# Patient Record
Sex: FEMALE | Race: WHITE
[De-identification: names, ages, dates, MRNs, and addresses within clinical notes are randomized per-mention and may not be internally consistent; named-entity substitution may affect disease eponyms.]

---

## 2017-05-22 NOTE — RADIOLOGY REPORT (SQ)
EXAM DESCRIPTION:  CT LTD RENAL STONE PROTOCOL ON



COMPLETED DATE/TIME:  5/22/2017 1:52 pm



REASON FOR STUDY:  r flank pain



COMPARISON:  None.



TECHNIQUE:  CT scan of the abdomen and pelvis performed without intravenous or oral contrast. Images 
reviewed with lung, soft tissue, and bone windows. Reconstructed coronal and sagittal MPR images revi
ewed. All images stored on PACS.

All CT scanners at this facility use dose modulation, iterative reconstruction, and/or weight based d
osing when appropriate to reduce radiation dose to as low as reasonably achievable (ALARA).

[facility]: Dose Right  [facility]: CareDose    [facility]: Dose Right    [facility]: Teradose 4D    [facility]: Smart Technologies



RADIATION DOSE:  10.57mGy.



LIMITATIONS:  None.



FINDINGS:  LOWER CHEST: No significant findings. No nodules or infiltrates.

NON-CONTRASTED LIVER, SPLEEN, ADRENALS: Evaluation limited by lack of IV contrast. No identified sign
ificant masses.

PANCREAS: No masses. No peripancreatic inflammatory changes.

GALLBLADDER: No identified stones by CT criteria. No inflammatory changes to suggest cholecystitis.

RIGHT KIDNEY AND URETER: No suspicious masses. Assessment limited by lack of IV contrast.   Small bucky
yceal calcifications.   No hydronephrosis or hydroureter.

LEFT KIDNEY AND URETER: No suspicious masses. Assessment limited by lack of IV contrast.   Small lisa
ceal calcifications.   No hydronephrosis or hydroureter.

AORTA AND RETROPERITONEUM: No aneurysm. No retroperitoneal masses or adenopathy.

BOWEL AND PERITONEAL CAVITY: No obvious masses or inflammatory changes. No free fluid.

APPENDIX: Surgically absent.

PELVIS, BLADDER, AND ABDOMINAL WALL:No abnormal masses. No free fluid. Bladder normal.

BONES: No significant findings.

OTHER: No other significant finding.



IMPRESSION:  SMALL NONOBSTRUCTING CALYCEAL CALCULI IN BOTH KIDNEYS.  NO URETERAL CALCULI OR OBSTRUCTI
VE UROPATHY.  NO OTHER SIGNIFICANT OR ACUTE PROCESS IN THE ABDOMEN OR PELVIS.



TECHNICAL DOCUMENTATION:  JOB ID:  [id number]

Quality ID # 436: Final reports with documentation of one or more dose reduction techniques (e.g., Au
tomated exposure control, adjustment of the mA and/or kV according to patient size, use of iterative 
reconstruction technique)

 2011 [employer]- All Rights Reserved

## 2017-05-22 NOTE — ER DOCUMENT REPORT
ED GI/





- General


Chief Complaint: Flank Pain


Stated Complaint: RIGHT FLANK PAIN


Time Seen by Provider: 17 11:42


Mode of Arrival: Ambulatory


Information source: Patient


Notes: 


Patient presents complaining of right flank pain for the past week.  Patient 

reports nausea and vomiting dating that she spit up once some mucus.  Patient 

denies any fever or diarrhea.  Patient does report urinary frequency and 

dysuria.  Patient has a history kidney stones in the past and suspects the same 

today.


TRAVEL OUTSIDE OF THE [place] IN LAST 30 DAYS: No





- HPI


Patient complains to provider of: Flank pain, Vomiting.  No: Abdominal pain, 

Vaginal pain


Onset: Last week


Timing/Duration: Persistent


Quality of pain: Sharp


Pain Level: 5


Location: Right flank.  No: RLQ


Vaginal bleeding (Compared to normal period): None


Associated symptoms: Dysuria, Nausea, Urinary frequency, Vomiting.  denies: 

Constipation, Diarrhea, Fever, Urinary hesitancy


Exacerbated by: Denies


Relieved by: Denies


Similar symptoms previously: Yes


Recently seen / treated by doctor: No





- Related Data


Allergies/Adverse Reactions: 


 





latex Allergy (Verified 17 11:33)


 


cillins Allergy (Uncoded 17 11:33)


 











Past Medical History





- General


Information source: Patient


Last Menstrual Period: mirena





- Social History


Smoking Status: Former Smoker


Frequency of alcohol use: None


Drug Abuse: None


Occupation: none


Lives with: Family


Family History: Reviewed & Not Pertinent


Patient has suicidal ideation: No


Patient has homicidal ideation: No





- Medical History


Medical History: Other - MS


Pulmonary Medical History: Reports: Hx COPD


Renal/ Medical History: Reports: Hx Kidney Stones.  Denies: Hx Peritoneal 

Dialysis


Musculoskeltal Medical History: Reports Hx Fibromyalgia


Past Surgical History: Reports: Hx  Section, Hx Gynecologic Surgery - r 

fallopian tube removed, Hx Urinary Tract Surgery - lithotripsy, renal stents





Review of Systems





- Review of Systems


Constitutional: Recent illness - pneumonia.  denies: Fever


EENT: No symptoms reported


Cardiovascular: No symptoms reported.  denies: Chest pain


Respiratory: No symptoms reported.  denies: Cough


Gastrointestinal: Nausea, Vomiting.  denies: Abdominal pain, Diarrhea, Poor 

appetite


Genitourinary: Dysuria, Frequency, Flank pain


Female Genitourinary: No symptoms reported.  denies: Pregnant, Vaginal discharge


Musculoskeletal: Back pain - right


Skin: No symptoms reported


Hematologic/Lymphatic: No symptoms reported


Neurological/Psychological: No symptoms reported





Physical Exam





- Vital signs


Vitals: 


 











Temp Pulse Resp BP Pulse Ox


 


 97.8 F   121 H  20   152/113 H  98 


 


 17 11:31  17 11:31  17 11:31  17 11:31  17 11:31














- General


General appearance: Appears well, Alert


In distress: None





- HEENT


Head: Normocephalic, Atraumatic


Eyes: Normal


Nasal: Normal


Mouth/Lips: Normal


Mucous membranes: Normal


Neck: Normal, Supple.  No: Lymphadenopathy





- Respiratory


Respiratory status: No respiratory distress


Chest status: Nontender


Breath sounds: Normal.  No: Rales, Rhonchi, Stridor, Wheezing


Chest palpation: Normal





- Cardiovascular


Rhythm: Tachycardia


Heart sounds: S1 appreciated, S2 appreciated


Murmur: No





- Abdominal


Inspection: Normal


Distension: No distension


Bowel sounds: Normal


Tenderness: Nontender


Organomegaly: No organomegaly





- Back


Back: CVA tenderness - right





- Extremities


General upper extremity: Normal inspection, Normal ROM


General lower extremity: Normal inspection, Normal ROM





- Neurological


Neuro grossly intact: Yes


Cognition: Normal


Gilbert Coma Scale Eye Opening: Spontaneous


Gilbert Coma Scale Verbal: Oriented


Gilbert Coma Scale Motor: Obeys Commands


Gilbert Coma Scale Total: 15





- Psychological


Associated symptoms: Normal affect, Normal mood





- Skin


Skin Temperature: Warm


Skin Moisture: Dry


Skin Color: Normal





Course





- Re-evaluation


Re-evalutation: 


17 13:11


Consulted with Dr. Azevedo who recommends CT limited imaging of her flank area to 

further evaluate her pain today.





17 14:30








- Vital Signs


Vital signs: 


 











Temp Pulse Resp BP Pulse Ox


 


 97.7 F   90   20   153/109 H  99 


 


 17 13:06  17 13:06  17 13:06  17 13:06  17 13:06














- Laboratory


Result Diagrams: 


 17 12:08





 17 12:08


Laboratory results interpreted by me: 


 











  17





  12:08 12:08 12:09


 


WBC  11.1 H  


 


RDW  18.3 H  


 


Plt Count  524 H  


 


Chloride   108 H 


 


Carbon Dioxide   19 L 


 


Glucose   118 H 


 


Direct Bilirubin   0.5 H 


 


Urine Protein    30 H


 


Urine Blood    LARGE H











 Labs- Entire Visit











  17





  12:08 12:08 12:08


 


WBC  11.1 H  


 


RBC  4.69  


 


Hgb  13.0  


 


Hct  39.4  


 


MCV  84  


 


MCH  27.7  


 


MCHC  33.0  


 


RDW  18.3 H  


 


Plt Count  524 H  


 


Seg Neutrophils %  71.4  


 


Lymphocytes %  22.6  


 


Monocytes %  4.4  


 


Eosinophils %  0.9  


 


Basophils %  0.7  


 


Absolute Neutrophils  8.0  


 


Absolute Lymphocytes  2.5  


 


Absolute Monocytes  0.5  


 


Absolute Eosinophils  0.1  


 


Absolute Basophils  0.1  


 


Sodium   137.5 


 


Potassium   4.0 


 


Chloride   108 H 


 


Carbon Dioxide   19 L 


 


Anion Gap   11 


 


BUN   11 


 


Creatinine   0.74 


 


Est GFR ( Amer)   > 60 


 


Est GFR (Non-Af Amer)   > 60 


 


Glucose   118 H 


 


Calcium   9.1 


 


Total Bilirubin   0.7 


 


Direct Bilirubin   0.5 H 


 


Indirect Bilirubin   Not Reportable 


 


Neonat Total Bilirubin   Not Reportable 


 


AST   17 


 


ALT   26 


 


Alkaline Phosphatase   85 


 


Total Protein   6.4 


 


Albumin   3.6 


 


Lipase   79.4 


 


Serum HCG, Qual    NEGATIVE


 


Urine Color   


 


Urine Appearance   


 


Urine pH   


 


Ur Specific Gravity   


 


Urine Protein   


 


Urine Glucose (UA)   


 


Urine Ketones   


 


Urine Blood   


 


Urine Nitrite   


 


Urine Bilirubin   


 


Urine Urobilinogen   


 


Ur Leukocyte Esterase   


 


Urine WBC (Auto)   


 


Urine RBC (Auto)   


 


U Hyaline Cast (Auto)   


 


Urine Bacteria (Auto)   


 


Squamous Epi Cells Auto   


 


Urine Mucus (Auto)   


 


Urine Ascorbic Acid   














  17





  12:09


 


WBC 


 


RBC 


 


Hgb 


 


Hct 


 


MCV 


 


MCH 


 


MCHC 


 


RDW 


 


Plt Count 


 


Seg Neutrophils % 


 


Lymphocytes % 


 


Monocytes % 


 


Eosinophils % 


 


Basophils % 


 


Absolute Neutrophils 


 


Absolute Lymphocytes 


 


Absolute Monocytes 


 


Absolute Eosinophils 


 


Absolute Basophils 


 


Sodium 


 


Potassium 


 


Chloride 


 


Carbon Dioxide 


 


Anion Gap 


 


BUN 


 


Creatinine 


 


Est GFR ( Amer) 


 


Est GFR (Non-Af Amer) 


 


Glucose 


 


Calcium 


 


Total Bilirubin 


 


Direct Bilirubin 


 


Indirect Bilirubin 


 


Neonat Total Bilirubin 


 


AST 


 


ALT 


 


Alkaline Phosphatase 


 


Total Protein 


 


Albumin 


 


Lipase 


 


Serum HCG, Qual 


 


Urine Color  YELLOW


 


Urine Appearance  SLIGHTLY-CLOUDY


 


Urine pH  6.0


 


Ur Specific Gravity  1.027


 


Urine Protein  30 H


 


Urine Glucose (UA)  NEGATIVE


 


Urine Ketones  NEGATIVE


 


Urine Blood  LARGE H


 


Urine Nitrite  NEGATIVE


 


Urine Bilirubin  NEGATIVE


 


Urine Urobilinogen  NEGATIVE


 


Ur Leukocyte Esterase  NEGATIVE


 


Urine WBC (Auto)  4


 


Urine RBC (Auto)  2


 


U Hyaline Cast (Auto)  1


 


Urine Bacteria (Auto)  TRACE


 


Squamous Epi Cells Auto  11


 


Urine Mucus (Auto)  RARE


 


Urine Ascorbic Acid  NEGATIVE














17 14:29








17 14:30








- Diagnostic Test


Radiology reviewed: Reports reviewed





Discharge





- Discharge


Clinical Impression: 


 Calyceal renal calculus, Flank pain





Condition: Stable


Disposition: HOME, SELF-CARE


Instructions:  Flank Pain ([facility]), Hematuria ([facility]), Oral Narcotic Medication ([facility])


Additional Instructions: 


Return immediately for any new or worsening symptoms





Followup with your primary care provider, call tomorrow to make a followup 

appointment





Follow Up with urologist for further evaluation, call tomorrow for an 

appointment


Prescriptions: 


Hydrocodone/Acetaminophen [Norco 5-325 Tablet] 1 each PO Q6 PRN #12 tablet


 PRN Reason: 


Referrals: 


[facility] [Provider Group] - Follow up as needed


TONA CADE MD [[facility]] - Follow up in 3-5 days

## 2017-08-17 NOTE — ER DOCUMENT REPORT
ED General Pain





- General


Chief Complaint: Pain All Over


Stated Complaint: PAIN ALL OVER


Time Seen by Provider: 17 15:07


Mode of Arrival: Ambulatory


Information source: Patient


Notes: 





Pt is a 41 year old female who presents to the ER for "pain all over" from her 

MS. She states she's had blood pressure as high as 188/90s today because of the 

pain. She says the worse part of her pain is in her back. She takes tylenol 

500mg for pain. Is supposed to be going to pain management. 


TRAVEL OUTSIDE OF THE [place] IN LAST 30 DAYS: No





- Related Data


Allergies/Adverse Reactions: 


 





latex Allergy (Verified 17 15:07)


 


cillins Allergy (Uncoded 17 15:07)


 











Past Medical History





- General


Information source: Patient





- Social History


Smoking Status: Current Every Day Smoker


Chew tobacco use (# tins/day): No


Frequency of alcohol use: None


Drug Abuse: None


Family History: Reviewed & Not Pertinent


Pulmonary Medical History: Reports: Hx COPD


Renal/ Medical History: Reports: Hx Kidney Stones.  Denies: Hx Peritoneal 

Dialysis


Musculoskeltal Medical History: Reports Hx Fibromyalgia


Past Surgical History: Reports: Hx  Section, Hx Gynecologic Surgery - r 

fallopian tube removed, Hx Urinary Tract Surgery - lithotripsy, renal stents





Review of Systems





- Review of Systems


Constitutional: No symptoms reported


EENT: No symptoms reported


Cardiovascular: No symptoms reported


Respiratory: No symptoms reported


Gastrointestinal: No symptoms reported


Genitourinary: No symptoms reported


Female Genitourinary: No symptoms reported


Musculoskeletal: See HPI


Skin: No symptoms reported


Hematologic/Lymphatic: No symptoms reported


Neurological/Psychological: No symptoms reported





Physical Exam





- Vital signs


Vitals: 


 











Temp Pulse Resp BP Pulse Ox


 


 97.7 F   88   20   170/93 H  98 


 


 17 14:41  17 14:41  17 14:41  17 14:41  17 14:41














- Notes


Notes: 





PHYSICAL EXAMINATION: 


GENERAL: Appears uncomfortable, but in no acute distress. 


HEAD: Atraumatic, normocephalic. 


EYES: Pupils equal round and reactive to light, extraocular movements intact, 

sclera anicteric, conjunctiva are normal. 


NECK: Normal range of motion, supple without lymphadenopathy 


LUNGS: CTAB and equal. No wheezes rales or rhonchi. 


HEART: Regular rate and rhythm without murmurs


ABDOMEN: Soft, no tenderness. No guarding, no rebound 


BACK: Lumbar vertebral tenderness, tender over entire low back, decreased range 

of motion due to pain


GI/: no CVA tenderness


EXTREMITIES: Normal range of motion, no pitting edema. No cyanosis. 


NEUROLOGICAL: Cranial nerves grossly intact. Normal sensory/motor exams. 


PSYCH: Normal mood, normal affect. 


SKIN: Warm, Dry, normal turgor, no rashes or lesions noted 

















Course





- Re-evaluation


Re-evalutation: 





17 21:04


I did advise patient that I would not give her a prescription for any narcotic 

medication but would give her something while she was here.  I did put her on a 

prednisone taper for her possible MS flare.





- Vital Signs


Vital signs: 


 











Temp Pulse Resp BP Pulse Ox


 


 97.8 F   80   18   148/133 H  98 


 


 17 15:24  17 15:24  17 15:24  17 15:24  17 15:24














Discharge





- Discharge


Clinical Impression: 


 Total body pain





Back pain


Qualifiers:


 Back pain location: low back pain Chronicity: chronic Back pain laterality: 

midline Sciatica presence: without sciatica Qualified Code(s): M54.5 - Low back 

pain





Condition: Stable


Disposition: HOME, SELF-CARE


Additional Instructions: 


FOLLOW-UP CARE:


If you have been referred to a physician for follow-up care, call the physician

s office for an appointment as you were instructed or within the next two days.

  If you experience worsening or a significant change in your symptoms, notify 

the physician immediately or return to the Emergency Department at any time for 

re-evaluation.


Prescriptions: 


Prednisone [Sterapred Ds] 1 pkg PO ASDIR PRN 12 Days  tab.ds.pk


 PRN Reason:

## 2017-10-30 ENCOUNTER — HOSPITAL ENCOUNTER (EMERGENCY)
Dept: HOSPITAL 62 - ER | Age: 42
Discharge: HOME | End: 2017-10-30
Payer: MEDICAID

## 2017-10-30 VITALS — SYSTOLIC BLOOD PRESSURE: 181 MMHG | DIASTOLIC BLOOD PRESSURE: 113 MMHG

## 2017-10-30 DIAGNOSIS — Z87.442: ICD-10-CM

## 2017-10-30 DIAGNOSIS — Z88.0: ICD-10-CM

## 2017-10-30 DIAGNOSIS — S01.81XA: ICD-10-CM

## 2017-10-30 DIAGNOSIS — S09.90XA: Primary | ICD-10-CM

## 2017-10-30 DIAGNOSIS — Z91.040: ICD-10-CM
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
## 2017-10-30 DIAGNOSIS — F17.200: ICD-10-CM

## 2017-10-30 DIAGNOSIS — J44.9: ICD-10-CM

## 2017-10-30 PROCEDURE — 99283 EMERGENCY DEPT VISIT LOW MDM: CPT
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
## 2017-11-14 ENCOUNTER — HOSPITAL ENCOUNTER (EMERGENCY)
Dept: HOSPITAL 62 - ER | Age: 42
Discharge: HOME | End: 2017-11-14
Payer: MEDICAID

## 2017-11-14 VITALS — SYSTOLIC BLOOD PRESSURE: 120 MMHG | DIASTOLIC BLOOD PRESSURE: 90 MMHG

## 2017-11-14 DIAGNOSIS — F17.200: ICD-10-CM

## 2017-11-14 DIAGNOSIS — I88.9: Primary | ICD-10-CM

## 2017-11-14 DIAGNOSIS — M54.2: ICD-10-CM

## 2017-11-14 DIAGNOSIS — J44.9: ICD-10-CM

## 2017-11-14 DIAGNOSIS — G35: ICD-10-CM

## 2017-11-14 DIAGNOSIS — Z91.040: ICD-10-CM

## 2017-11-14 DIAGNOSIS — Z88.0: ICD-10-CM

## 2017-11-14 PROCEDURE — 99283 EMERGENCY DEPT VISIT LOW MDM: CPT

## 2017-11-14 NOTE — ER DOCUMENT REPORT
ED General





- General


Chief Complaint: Neck Pain < 24hrs old


Stated Complaint: NECK PAIN


Time Seen by Provider: 17 02:49


Mode of Arrival: Ambulatory


Information source: Patient


TRAVEL OUTSIDE OF THE [place] IN LAST 30 DAYS: No





- HPI


Notes: 





42-year-old female with history of fibromyalgia and multiple sclerosis presents 

with right-sided neck pain.  She has noted knots and painful areas over the 

right posterior portion of her neck for the last 2 days.  She states they are 

very painful despite taking ibuprofen at home.  Denies fever or systemic 

symptoms.  She did have head trauma a few days back but states she had no 

injury to her neck at that point.  She denies any other new symptoms, focal 

weakness numbness or tingling.  Denies scalp laceration or pain.





- Related Data


Allergies/Adverse Reactions: 


 





latex Allergy (Verified 17 15:07)


 


cillins Allergy (Uncoded 17 15:07)


 











Past Medical History





- Social History


Smoking Status: Current Every Day Smoker


Chew tobacco use (# tins/day): No


Drug Abuse: None


Family History: Reviewed & Not Pertinent


Patient has suicidal ideation: No


Patient has homicidal ideation: No


Pulmonary Medical History: Reports: Hx COPD


Renal/ Medical History: Reports: Hx Kidney Stones.  Denies: Hx Peritoneal 

Dialysis


Musculoskeltal Medical History: Reports Hx Fibromyalgia


Past Surgical History: Reports: Hx  Section, Hx Gynecologic Surgery - r 

fallopian tube removed, Hx Urinary Tract Surgery - lithotripsy, renal stents





Review of Systems





- Review of Systems


-: Yes All other systems reviewed and negative





Physical Exam





- Vital signs


Vitals: 


 











Temp Pulse Resp BP Pulse Ox


 


 98 F   132 H  18   153/96 H  98 


 


 17 02:20  17 02:20  17 02:20  17 02:20  17 02:20














- Notes


Notes: 





GENERAL: VS as per nursing doc. appears older than stated age, well-nourished 

and in no acute distress.





HEAD: Atraumatic, normocephalic.





EYES: Pupils equal round and reactive to light, extraocular movements intact, 

sclera anicteric, no conjunctival injection or discharge.





ENT: Nares patent, oropharynx clear without exudates, moist mucous membranes.  

There are mobile nodes noted in the right posterior triangle with the largest 

being approximately 1 cm.  Very tender but no overlying erythema or separation.

  There are some areas of excoriation mild erythema more superiorly toward the 

mastoid region but the mastoid itself is nontender and not affected.  

Evaluation of the scalp otherwise show no lesions in the hair.





NECK: Normal range of motion, supple without lymphadenopathy.





LUNGS: Coarse with decreased breath sounds bilaterally





HEART: Regular rhythm, no murmurs, heart rate 102 on recheck





ABDOMEN: Soft, non-tender, normoactive bowel sounds.  No guarding, no rebound.  

No masses appreciated.  No [place] sign.





EXTREMITIES: Normal range of motion, no calf tenderness, no edema.





NEUROLOGICAL: No gross motor or sensory abnormalities.  Cranial nerves intact.





PSYCH: Normal mood, normal affect.





SKIN: Warm, dry, normal turgor, please see ENT.











Course





- Re-evaluation


Re-evalutation: 





17 03:19


This does not appear to be trauma related as there is no midline tenderness and 

has clearly palpable posterior lymph nodes.  I do have some concern as there 

are some minimal cellulitic areas that appear to be more excoriations near the 

right mastoid just superior.  We will place her on antibiotics and have her get 

close follow-up.  She understands warning signs to watch for.  Tachycardia had 

essentially resolved from the initial triage note at the time of my evaluation.





- Vital Signs


Vital signs: 


 











Temp Pulse Resp BP Pulse Ox


 


 98 F   132 H  18   153/96 H  98 


 


 17 02:20  17 02:20  17 02:20  17 02:20  17 02:20














Discharge





- Discharge


Clinical Impression: 


 Cervical adenitis





Condition: Good


Disposition: HOME, SELF-CARE


Instructions:  Cervical Lymphadenitis ([facility])


Additional Instructions: 


Return for any problem or concern.  Finish all of the antibiotics.  Return for 

Emergency or concern.  Continue anti-inflammatories such as Ibuprofen or Aleve.

  Follow-up with your physician in the next 2 days for recheck.


You have been prescribed a prescription for narcotics.  Narcotics are very 

helpful in relieving your pain.  Adequate pain control is the goal for the 

medical use of narcotics.  Please be aware that prescription narcotics also 

have the potential for abuse.  People become addicted to these substances 

because of the general sense of wellbeing that they induce.  This coupled with 

a significant reduction in tension, anxiety and aggression provides a 

stimulating seductive quality to these drugs.  Once your pain is under control 

we encourage you to discard your unused narcotics.


Prescriptions: 


Acetaminophen with Codeine [Tylenol #3 Tablet] 1 - 2 each PO Q6HP PRN #14 tablet


 PRN Reason: For Pain


Acetaminophen with Codeine [Tylenol #3 Tablet] 1 - 2 each PO Q6HP PRN #14 tablet


 PRN Reason: For Pain


Doxycycline Hyclate 100 mg PO BID #14 capsule

## 2018-02-20 ENCOUNTER — HOSPITAL ENCOUNTER (EMERGENCY)
Dept: HOSPITAL 62 - ER | Age: 43
Discharge: HOME | End: 2018-02-20
Payer: MEDICAID

## 2018-02-20 VITALS — DIASTOLIC BLOOD PRESSURE: 99 MMHG | SYSTOLIC BLOOD PRESSURE: 128 MMHG

## 2018-02-20 DIAGNOSIS — Z87.440: ICD-10-CM

## 2018-02-20 DIAGNOSIS — D69.6: ICD-10-CM

## 2018-02-20 DIAGNOSIS — J44.9: ICD-10-CM

## 2018-02-20 DIAGNOSIS — Z88.0: ICD-10-CM

## 2018-02-20 DIAGNOSIS — R42: ICD-10-CM

## 2018-02-20 DIAGNOSIS — Z91.040: ICD-10-CM

## 2018-02-20 DIAGNOSIS — R79.89: ICD-10-CM

## 2018-02-20 DIAGNOSIS — R30.0: Primary | ICD-10-CM

## 2018-02-20 DIAGNOSIS — Z87.442: ICD-10-CM

## 2018-02-20 DIAGNOSIS — R41.82: ICD-10-CM

## 2018-02-20 LAB
ABSOLUTE LYMPHOCYTES# (MANUAL): 2.4 10^3/UL (ref 0.5–4.7)
ABSOLUTE MONOCYTES # (MANUAL): 0.8 10^3/UL (ref 0.1–1.4)
ABSOLUTE NEUTROPHILS# (MANUAL): 5.2 10^3/UL (ref 1.7–8.2)
ADD MANUAL DIFF: YES
ALBUMIN SERPL-MCNC: 4.1 G/DL (ref 3.5–5)
ALP SERPL-CCNC: 154 U/L (ref 38–126)
ALT SERPL-CCNC: 126 U/L (ref 9–52)
ANION GAP SERPL CALC-SCNC: 15 MMOL/L (ref 5–19)
ANISOCYTOSIS BLD QL SMEAR: (no result)
APPEARANCE UR: (no result)
APTT PPP: YELLOW S
AST SERPL-CCNC: 98 U/L (ref 14–36)
BASOPHILS NFR BLD MANUAL: 0 % (ref 0–2)
BILIRUB DIRECT SERPL-MCNC: 0.3 MG/DL (ref 0–0.4)
BILIRUB SERPL-MCNC: 0.3 MG/DL (ref 0.2–1.3)
BILIRUB UR QL STRIP: NEGATIVE
BUN SERPL-MCNC: 19 MG/DL (ref 7–20)
CALCIUM: 9.4 MG/DL (ref 8.4–10.2)
CHLAM PCR: NOT DETECTED
CHLORIDE SERPL-SCNC: 106 MMOL/L (ref 98–107)
CO2 SERPL-SCNC: 19 MMOL/L (ref 22–30)
EOSINOPHIL NFR BLD MANUAL: 2 % (ref 0–6)
ERYTHROCYTE [DISTWIDTH] IN BLOOD BY AUTOMATED COUNT: 18.7 % (ref 11.5–14)
GLUCOSE SERPL-MCNC: 115 MG/DL (ref 75–110)
GLUCOSE UR STRIP-MCNC: NEGATIVE MG/DL
GON PCR: NOT DETECTED
HCT VFR BLD CALC: 41.1 % (ref 36–47)
HGB BLD-MCNC: 14 G/DL (ref 12–15.5)
KETONES UR STRIP-MCNC: NEGATIVE MG/DL
MCH RBC QN AUTO: 28.1 PG (ref 27–33.4)
MCHC RBC AUTO-ENTMCNC: 33.9 G/DL (ref 32–36)
MCV RBC AUTO: 83 FL (ref 80–97)
MONOCYTES % (MANUAL): 9 % (ref 3–13)
NITRITE UR QL STRIP: NEGATIVE
PH UR STRIP: 6 [PH] (ref 5–9)
PLATELET # BLD: 1027 10^3/UL (ref 150–450)
PLATELET COMMENT: (no result)
POIKILOCYTOSIS BLD QL SMEAR: SLIGHT
POTASSIUM SERPL-SCNC: 3.5 MMOL/L (ref 3.6–5)
PROT SERPL-MCNC: 7.8 G/DL (ref 6.3–8.2)
PROT UR STRIP-MCNC: 30 MG/DL
RBC # BLD AUTO: 4.98 10^6/UL (ref 3.72–5.28)
SEGMENTED NEUTROPHILS % (MAN): 61 % (ref 42–78)
SODIUM SERPL-SCNC: 139.6 MMOL/L (ref 137–145)
SP GR UR STRIP: 1.02
T.VAGINALIS (WET MOUNT): (no result)
TOTAL CELLS COUNTED BLD: 100
TOXIC GRANULES BLD QL SMEAR: SLIGHT
UROBILINOGEN UR-MCNC: 2 MG/DL (ref ?–2)
VARIANT LYMPHS NFR BLD MANUAL: 28 % (ref 13–45)
WBC # BLD AUTO: 8.6 10^3/UL (ref 4–10.5)
WBCS (WET MOUNT): (no result)
YEAST (WET MOUNT): (no result)

## 2018-02-20 PROCEDURE — 80307 DRUG TEST PRSMV CHEM ANLYZR: CPT

## 2018-02-20 PROCEDURE — 36415 COLL VENOUS BLD VENIPUNCTURE: CPT

## 2018-02-20 PROCEDURE — 85025 COMPLETE CBC W/AUTO DIFF WBC: CPT

## 2018-02-20 PROCEDURE — 81001 URINALYSIS AUTO W/SCOPE: CPT

## 2018-02-20 PROCEDURE — 99284 EMERGENCY DEPT VISIT MOD MDM: CPT

## 2018-02-20 PROCEDURE — 84703 CHORIONIC GONADOTROPIN ASSAY: CPT

## 2018-02-20 PROCEDURE — 87491 CHLMYD TRACH DNA AMP PROBE: CPT

## 2018-02-20 PROCEDURE — 80053 COMPREHEN METABOLIC PANEL: CPT

## 2018-02-20 PROCEDURE — 87210 SMEAR WET MOUNT SALINE/INK: CPT

## 2018-02-20 PROCEDURE — 87591 N.GONORRHOEAE DNA AMP PROB: CPT

## 2018-02-20 PROCEDURE — 96360 HYDRATION IV INFUSION INIT: CPT

## 2018-02-20 NOTE — ER DOCUMENT REPORT
ED Dizziness/Weakness





- General


Chief Complaint: Dizziness


Stated Complaint: ALTERED MENTAL STATUS


Time Seen by Provider: 18 16:14


Mode of Arrival: Ambulatory


Notes: 


The patient is a 42-year-old female, past medical history frequent UTIs and 

kidney stones, multiple sclerosis, presents with dysuria, pelvic burning and 

mild confusion.  She started taking Cipro 12 days ago and says that when she 

takes Cipro, she feels this way.  Her confusion has resolved, but she is still 

having dysuria.  Patient was taking Tylenol for her pain relief.  Patient 

denies fevers, flank pain, nausea, vomiting, hallucinations, headache, blurry 

vision, numbness, tingling, concern for STDs, vaginal bleeding or discharge.


TRAVEL OUTSIDE OF THE [place] IN LAST 30 DAYS: No





- Related Data


Allergies/Adverse Reactions: 


 





latex Allergy (Verified 18 15:17)


 


cillins Allergy (Uncoded 18 15:17)


 











Past Medical History





- General


Information source: Patient





- Social History


Smoking Status: Never Smoker


Cigarette use (# per day): Yes


Chew tobacco use (# tins/day): No


Frequency of alcohol use: None


Drug Abuse: None


Family History: Reviewed & Not Pertinent


Patient has suicidal ideation: No


Patient has homicidal ideation: No


Pulmonary Medical History: Reports: Hx COPD


Renal/ Medical History: Reports: Hx Kidney Stones.  Denies: Hx Peritoneal 

Dialysis


Musculoskeltal Medical History: Reports Hx Fibromyalgia


Past Surgical History: Reports: Hx  Section, Hx Gynecologic Surgery - r 

fallopian tube removed, Hx Urinary Tract Surgery - lithotripsy, renal stents





Review of Systems





- Review of Systems


Notes: 


REVIEW OF SYSTEMS:


CONSTITUTIONAL: -fevers, -chills


EENT: -eye pain, -difficulty swallowing, -nasal congestion


CARDIOVASCULAR: -chest pain, -syncope.


RESPIRATORY: -cough, -SOB


GASTROINTESTINAL: -abdominal pain, -nausea, -vomiting, -diarrhea


GENITOURINARY: +dysuria, -hematuria


MUSCULOSKELETAL: -back pain, -neck pain


SKIN: -rash or skin lesions.


HEMATOLOGIC: -easy bruising or bleeding.


LYMPHATIC: -swollen, enlarged glands.


NEUROLOGICAL: -loss of consciousness, -headache, -neurologic symptoms


PSYCHIATRIC: -anxiety, -depression.


ALL OTHER SYSTEMS REVIEWED AND NEGATIVE.





Physical Exam





- Vital signs


Vitals: 


 











Temp Pulse Resp BP Pulse Ox


 


 98.3 F   114 H  18   119/97 H  99 


 


 18 15:42  18 15:42  18 15:42  18 15:42  18 15:42














- Notes


Notes: 


PHYSICAL EXAMINATION:


GENERAL: Well-appearing, well-nourished and in no acute distress.


HEAD: Atraumatic, normocephalic.


EYES: Pupils equal round and reactive to light, extraocular movements intact, 

sclera anicteric, conjunctiva are normal.


ENT: nares patent, oropharynx clear without exudates.  Moist mucous membranes.


NECK: Normal range of motion, supple without lymphadenopathy


LUNGS: Breath sounds clear to auscultation bilaterally and equal.  No wheezes 

rales or rhonchi.


HEART: Regular rate and rhythm without murmurs


: (chaperoned by SKYLER Sebastian) no vaginal discharge or bleeding, nontender 

uterus and adnexa, no CMT


ABDOMEN: Soft, nontender, normoactive bowel sounds.  No guarding, no rebound.  

No masses appreciated.


EXTREMITIES: Normal range of motion, no pitting or edema.  No cyanosis.


NEUROLOGICAL: Cranial nerves grossly intact.  Normal speech.  Normal sensory 

and motor exams.


PSYCH: Normal mood, normal affect.


SKIN: Warm, Dry, normal turgor, no rashes or lesions noted.





Course





- Re-evaluation


Re-evalutation: 


Patient appears well and in no acute distress.  Her initial tachycardia in 

triage resolved.  Her urinalysis does not show any evidence of a urinary tract 

infection.  Patient's blood work is remarkable for slightly elevated LFTs, but 

she does not have any RUQ pain or tenderness. She also has thrombocytosis, but 

no signs or symptoms of increased viscosity. Will have her platelet count and 

LFTs repeated in 1 week by her primary care physician.  Patient provided with 

Pyridium and given very strict return precautions.





- Vital Signs


Vital signs: 


 











Temp Pulse Resp BP Pulse Ox


 


 97.7 F   114 H  18   119/97 H  99 


 


 18 18:27  18 15:42  18 15:42  18 15:42  18 15:42














- Laboratory


Result Diagrams: 


 18 16:25





 18 16:25


Laboratory results interpreted by me: 


 











  18





  16:25 16:25 16:25


 


RDW  18.7 H  


 


Plt Count  1027 H*  


 


Potassium   3.5 L 


 


Carbon Dioxide   19 L 


 


Est GFR (Non-Af Amer)   53 L 


 


Glucose   115 H 


 


AST   98 H 


 


ALT   126 H 


 


Alkaline Phosphatase   154 H 


 


Urine Protein    30 H


 


Urine Urobilinogen    2.0 H


 


Acetaminophen   














  18





  16:25


 


RDW 


 


Plt Count 


 


Potassium 


 


Carbon Dioxide 


 


Est GFR (Non-Af Amer) 


 


Glucose 


 


AST 


 


ALT 


 


Alkaline Phosphatase 


 


Urine Protein 


 


Urine Urobilinogen 


 


Acetaminophen  < 10 L














Discharge





- Discharge


Clinical Impression: 


 Dysuria, Thrombocythemia, Elevated LFTs





Condition: Stable


Disposition: HOME, SELF-CARE


Additional Instructions: 


Have your liver enzymes and platelets rechecked next week by her primary care 

physician as they were elevated today.  Take the Pyridium as instructed to help 

with your pain.  Follow-up with your primary care physician next week to 

recheck your symptoms.








URINARY ANESTHETIC AGENT:


     You have been given a medication (Pyridium) for urinary tract discomfort. 

This medicine numbs the lining of the bladder and urethra, resulting in less 

pain, burning, and urgency.  You may take it as needed, according to 

instructions.  When the symptoms resolve, you can stop this medication (be sure 

to continue any other medications the doctor has given you).


     This medicine turns the urine a dark orange.  It may stain underwear.  

Occasionally, it can cause nausea.  Return for evaluation if there are any 

unexpected effects, such as itching, hives, or shortness of breath.








FOLLOW-UP CARE:


If you have been referred to a physician for follow-up care, call the physician

s office for an appointment as you were instructed or within the next two days.

  If you experience worsening or a significant change in your symptoms, notify 

the physician immediately or return to the Emergency Department at any time for 

re-evaluation.


Prescriptions: 


Phenazopyridine HCl [Pyridium 200 mg Tablet] 200 mg PO TID #15 tablet


Forms:  Elevated Blood Pressure


Referrals: 


[facility] [Outside] - Follow up as needed

## 2018-02-20 NOTE — ER DOCUMENT REPORT
ED Medical Screen (RME)





- General


Chief Complaint: Dizziness


Stated Complaint: ALTERED MENTAL STATUS


Time Seen by Provider: 18 16:14


Mode of Arrival: Ambulatory


Information source: Patient


Notes: 


Patient is a 42-year-old female with a history of UTIs and kidney stones 

presents to the emergency department complaining of pelvic pain.  Patient 

states that she was diagnosed with a UTI on the  and prescribed antibiotics 

but her symptoms have persisted.  Patient states that she is in constant pelvic 

pain with associated symptoms of dysuria and fevers.  Mother who is at bedside 

states that the patient has also been confused further stating that the patient 

does not remember crying prior to arrival or answering her questions normally.  

At bedside patient is alert and oriented3





TRAVEL OUTSIDE OF THE [place] IN LAST 30 DAYS: No





- Related Data


Allergies/Adverse Reactions: 


 





latex Allergy (Verified 18 15:17)


 


cillins Allergy (Uncoded 18 15:17)


 











Past Medical History





- General


Information source: Patient





- Social History


Cigarette use (# per day): Yes


Family history: Reviewed & Not Pertinent


Pulmonary Medical History: Reports: Hx COPD


Renal/ Medical History: Reports: Hx Kidney Stones


Musculoskeltal Medical History: Reports Hx Fibromyalgia


Past Surgical History: Reports: Hx  Section, Hx Gynecologic Surgery - r 

fallopian tube removed, Hx Urinary Tract Surgery - lithotripsy, renal stents





Physical Exam





- Vital signs


Vitals: 





 











Temp Pulse Resp BP Pulse Ox


 


 98.3 F   114 H  18   119/97 H  99 


 


 18 15:42  18 15:42  18 15:42  18 15:42  18 15:42














- Notes


Notes: 


GENERAL: Alert, interacts well. No acute distress.


HEAD: Normocephalic, Atraumatic. 


NECK: Full range of motion. Supple. Trachea midline. 


LUNGS: Clear to auscultation bilaterally, no wheezes, rales, or rhonchi. No 

respiratory distress.


HEART: Tachycardic. No murmurs, gallops, or rubs.


ABDOMEN: Soft, non-tender. Non-distended. Bowel sounds present in all 4 

quadrants.


EXTREMITIES: Moves all four extremities spontaneously. 








Course





- Vital Signs


Vital signs: 





 











Temp Pulse Resp BP Pulse Ox


 


 98.3 F   114 H  18   119/97 H  99 


 


 18 15:42  18 15:42  18 15:42  18 15:42  18 15:42














Scribe Documentation





- Scribe


Written by Suzi:: Suzi Guzman, 2018 16:19


acting as scribe for :: Mor

## 2018-02-21 LAB — PATH REV BLD -IMP: (no result)

## 2018-07-16 ENCOUNTER — HOSPITAL ENCOUNTER (EMERGENCY)
Dept: HOSPITAL 62 - ER | Age: 43
LOS: 1 days | Discharge: HOME | End: 2018-07-17
Payer: MEDICAID

## 2018-07-16 DIAGNOSIS — R00.0: ICD-10-CM

## 2018-07-16 DIAGNOSIS — T40.1X1A: Primary | ICD-10-CM

## 2018-07-16 DIAGNOSIS — G89.29: ICD-10-CM

## 2018-07-16 DIAGNOSIS — R07.9: ICD-10-CM

## 2018-07-16 DIAGNOSIS — R11.2: ICD-10-CM

## 2018-07-16 DIAGNOSIS — Z88.0: ICD-10-CM

## 2018-07-16 DIAGNOSIS — Z91.040: ICD-10-CM

## 2018-07-16 DIAGNOSIS — M79.7: ICD-10-CM

## 2018-07-16 DIAGNOSIS — J44.9: ICD-10-CM

## 2018-07-16 DIAGNOSIS — F14.10: ICD-10-CM

## 2018-07-16 DIAGNOSIS — F17.200: ICD-10-CM

## 2018-07-16 PROCEDURE — 71045 X-RAY EXAM CHEST 1 VIEW: CPT

## 2018-07-16 PROCEDURE — 93010 ELECTROCARDIOGRAM REPORT: CPT

## 2018-07-16 PROCEDURE — 99284 EMERGENCY DEPT VISIT MOD MDM: CPT

## 2018-07-16 PROCEDURE — 96372 THER/PROPH/DIAG INJ SC/IM: CPT

## 2018-07-16 PROCEDURE — 93005 ELECTROCARDIOGRAM TRACING: CPT

## 2018-07-16 NOTE — ER DOCUMENT REPORT
ED General





- General


Stated Complaint: POSSIBLE OVERDOSE


Time Seen by Provider: 18 22:55


Notes: 





Patient is a 42-year-old female who became apneic because she started what she 

thought was cocaine but was actually heroin.  Patient now says that she is 

nauseous and has been vomiting.  She is given Narcan by the paramedics.  There 

was some bystander CPR.  She has a history of fibromyalgia as well as MS.  She 

has no other complaints at this time.  She says she does not do drugs on a 

regular basis but only when she "parties".


TRAVEL OUTSIDE OF THE [place] IN LAST 30 DAYS: No





- Related Data


Allergies/Adverse Reactions: 


 





latex Allergy (Verified 18 15:17)


 


cillins Allergy (Uncoded 18 15:17)


 











Past Medical History





- Social History


Smoking Status: Current Every Day Smoker


Frequency of alcohol use: None


Drug Abuse: Cocaine, Heroin


Family History: Reviewed & Not Pertinent


Pulmonary Medical History: Reports: Hx COPD


Renal/ Medical History: Reports: Hx Kidney Stones.  Denies: Hx Peritoneal 

Dialysis


GI Medical History: Reports: Hx Gastroesophageal Reflux Disease


Musculoskeletal Medical History: Reports Hx Fibromyalgia


Past Surgical History: Reports: Hx  Section, Hx Gynecologic Surgery - r 

fallopian tube removed, Hx Urinary Tract Surgery - lithotripsy, renal stents





Review of Systems





- Review of Systems


Notes: 





My Normal Review Basic





REVIEW OF SYSTEMS:


CONSTITUTIONAL :  Denies fever,  chills, or sweats.  Denies recent illness.


EENT:   Denies eye, ear, throat, or mouth pain or symptoms.  Denies nasal or 

sinus congestion.


CARDIOVASCULAR: Some palpable pain to chest from CPR


RESPIRATORY:  Denies cough, cold, or chest congestion.  Denies shortness of 

breath, difficulty breathing, or wheezing.


GASTROINTESTINAL:  Denies abdominal pain.  Denies nausea, vomiting, or 

diarrhea.   


MUSCULOSKELETAL: Chronic muscular pain.


SKIN:   Denies rash or skin lesions.


NEUROLOGICAL:  Denies altered mental status or loss of consciousness.  Denies 

headache.  Denies weakness or paralysis or loss of use of either side.  Denies 

problems with gait or speech.  Denies sensory or motor loss.


ALL OTHER SYSTEMS REVIEWED AND NEGATIVE.





Physical Exam





- Vital signs


Vitals: 


 











Resp Pulse Ox


 


 15   94 


 


 18 22:52  18 22:52














- Notes


Notes: 





General Appearance: Well nourished, alert, cooperative, no acute distress, mild 

obvious discomfort.


Vitals: reviewed, See vital signs table.


Head: no swelling or tenderness to the head


Eyes: PERRL, EOMI, Conjuctiva clear


Mouth: No decreasd moisture


Neck: Supple, no neck tenderness, No thyromegaly


Chest wall: Tenderness palpation of chest wall.


Lungs: No wheezing, No rales, No rhonci, No accessory muscle use, good air 

exchange bilaterally.


Heart: Normal rate, Regular rythm, No murmur, no rub


Abdomen: Normal BS, soft, No rigidity, No abdominal tenderness, No guarding, no 

rebound, no abdominal masses, no organomegaly


Extremities: strength 5/5 in all extremities, good pulses in all extremities, 

no swelling or tenderness in the extremities, no edema.


Skin: warm, dry, appropriate color, no rash


Neuro: speech clear, oriented x 3, normal affect, responds appropriately to 

questions.





Course





- Re-evaluation


Re-evalutation: 





18 06:24


Patient has been monitored for over 2 hours has not had any hypoxemia recurrent 

somnolence.  She has been tachycardic which I think is a result of her 

receiving the Narcan and also her having chronic pain.  Patient says that she 

has pain over her entire body because of her MS.  I suspect she is going 

through opiate withdrawal after receiving the Narcan.  Patient initially denied 

any opiate use but then she was talking to me about different pain medications.

  I offered to give her some Tylenol for pain.  Patient said that she cannot 

take Tylenol she is allergic to it.  I asked her what her allergies and then 

she said that makes her vomit.  She also mentions that her doctor told her she 

cannot take it does affect her kidneys.  I informed her that Tylenol has no 

effect on the kidneys.  Patient then said that she can take Percocet.  I 

informed her that Percocet has Tylenol in it and that what she previously told 

me did not make sense.  Patient then said that she is not truly allergic to 

Tylenol and that it just does not work for her.  Do not feel that given the 

patient opiates would be a appropriate.  I think this would possibly just lead 

to more abuse of the opiates potentially could cause her harm.  I have written 

prescription for Narcan in case she does ever use heroin again and stops 

breathing.  I informed her follow-up closely with her primary care doctor.  I 

encouraged her return to ER if she has recurrent vomiting, difficulty breathing

, or she feels unwell.  Patient agrees with plan will be discharged home.





Dictation of this chart was performed using voice recognition software; 

therefore, there may be some unintended grammatical errors.





- Vital Signs


Vital signs: 


 











Temp Pulse Resp BP Pulse Ox


 


 98.1 F      26 H  129/86 H  95 


 


 18 02:02     18 02:02  18 02:02  18 02:02














- EKG Interpretation by Me


Additional EKG results interpreted by me: 





18 23:00


EKG is reviewed and interpreted by me.  EKG shows sinus tachycardia with a rate 

of 101 bpm.  No ST segment elevation or depression.  No ischemic T-wave 

inversions.  CA interval, QRS duration, QTc intervals are within normal range.  

No old EKG available for comparison.





Discharge





- Discharge


Clinical Impression: 


Opiate overdose


Qualifiers:


 Encounter type: initial encounter Injury intent: accidental or unintentional 

Qualified Code(s): T40.601A - Poisoning by unspecified narcotics, accidental (

unintentional), initial encounter





Chronic pain


Qualifiers:


 Chronic pain type: other chronic pain Qualified Code(s): G89.29 - Other 

chronic pain





Condition: Good


Disposition: HOME, SELF-CARE


Additional Instructions: 


Please follow up closely with your doctor in regards to further pain control. 

Please do not do any illegal drugs and never take medications that are not 

prescribed to you.  We understand that sometimes people do end up still doing 

heroin or other opiate medications.  I have prescribed you Narcan. People will 

still potentially overdose or take too much of an opiate medication.  Please 

keep the Narcan with you so as if you do overdose again you have a medication 

that can reverse the overdose so that you do not stop breathing.  If you have 

to use the Narcan you should return to the ER immediately. Return to the ER 

immediately if you develop difficulty breathing or feel more sleepy than usual.


Prescriptions: 


Naloxone HCl [Narcan] 4 mg NS CONCHITA #1 spray


Referrals: 


[facility] [Primary Care Provider] - Follow up as needed

## 2018-07-17 VITALS — SYSTOLIC BLOOD PRESSURE: 129 MMHG | DIASTOLIC BLOOD PRESSURE: 86 MMHG

## 2018-07-17 NOTE — EKG REPORT
SEVERITY:- BORDERLINE ECG -

SINUS TACHYCARDIA

PROBABLE LEFT ATRIAL ABNORMALITY

:

Confirmed by: Ros Rowe 17-Jul-2018 10:06:19

## 2018-07-17 NOTE — RADIOLOGY REPORT (SQ)
EXAM DESCRIPTION: 



XR CHEST 1 VIEW



COMPLETED DATE/TME:  07/16/2018 22:56



CLINICAL HISTORY: 



42 years, Female, overdose



COMPARISON:

None.



NUMBER OF VIEWS:

One



TECHNIQUE:

AP view of the chest



LIMITATIONS:

None.



FINDINGS:



The lungs are clear. The heart is normal in size. There is no

pneumothorax or pleural effusion. There is no acute fracture



IMPRESSION:



No acute cardiopulmonary abnormality

 



 2011 [employer]- All Rights Reserved